# Patient Record
Sex: MALE | Race: BLACK OR AFRICAN AMERICAN | NOT HISPANIC OR LATINO | Employment: OTHER | URBAN - METROPOLITAN AREA
[De-identification: names, ages, dates, MRNs, and addresses within clinical notes are randomized per-mention and may not be internally consistent; named-entity substitution may affect disease eponyms.]

---

## 2023-09-26 ENCOUNTER — TELEPHONE (OUTPATIENT)
Dept: NEUROLOGY | Facility: CLINIC | Age: 73
End: 2023-09-26

## 2023-09-26 NOTE — TELEPHONE ENCOUNTER
Caller: ANIBAL    Relationship: DAUGHTER    Best call back number:   Telephone Information:   Mobile [phone]     What is the best time to reach you: ANYTIME    Who are you requesting to speak with (clinical staff, provider,  specific staff member): PROVIDER    What was the call regarding: SHE STATES SHE WOULD LIKE TO SPEAK WITH PROVIDER BEFORE THE APPOINTMENT TO UPDATE HER ON THINGS THAT HAS TRANSPIRED SINCE SHE LEFT [facility] AND JOINED [facility], HIS WIFE IS DYING OF CANCER AND HE IS STARTING TO HALLUCINATE.  SHE STATES SHE WOULD LIKE TO DISCUSS PRIVATELY.

## 2023-09-28 NOTE — TELEPHONE ENCOUNTER
This is the patient we spoke about. Can you get them the number for senior home transitions and then schedule them for next Wednesday.

## 2023-10-04 ENCOUNTER — OFFICE VISIT (OUTPATIENT)
Dept: NEUROLOGY | Facility: CLINIC | Age: 73
End: 2023-10-04

## 2023-10-04 VITALS
OXYGEN SATURATION: 98 % | HEIGHT: 66 IN | DIASTOLIC BLOOD PRESSURE: 60 MMHG | SYSTOLIC BLOOD PRESSURE: 110 MMHG | BODY MASS INDEX: 24.11 KG/M2 | WEIGHT: 150 LBS | HEART RATE: 81 BPM

## 2023-10-04 DIAGNOSIS — G81.94 LEFT HEMIPARESIS: ICD-10-CM

## 2023-10-04 DIAGNOSIS — I69.30 HISTORY OF STROKE WITH RESIDUAL DEFICIT: ICD-10-CM

## 2023-10-04 DIAGNOSIS — F32.A DEPRESSION, UNSPECIFIED DEPRESSION TYPE: ICD-10-CM

## 2023-10-04 DIAGNOSIS — F03.90 DEMENTIA WITHOUT BEHAVIORAL DISTURBANCE: Primary | ICD-10-CM

## 2023-10-04 RX ORDER — ATORVASTATIN CALCIUM 40 MG/1
40 TABLET, FILM COATED ORAL DAILY
COMMUNITY
Start: 2023-08-30

## 2023-10-04 RX ORDER — HYDROCHLOROTHIAZIDE 25 MG/1
TABLET ORAL
COMMUNITY
Start: 2023-05-15

## 2023-10-04 RX ORDER — CLOPIDOGREL BISULFATE 75 MG/1
1 TABLET ORAL DAILY
COMMUNITY
Start: 2023-06-09

## 2023-10-04 RX ORDER — SERTRALINE HYDROCHLORIDE 25 MG/1
25 TABLET, FILM COATED ORAL DAILY
Qty: 30 TABLET | Refills: 2 | Status: SHIPPED | OUTPATIENT
Start: 2023-10-04 | End: 2024-01-02

## 2023-10-04 RX ORDER — AMLODIPINE BESYLATE 5 MG/1
1 TABLET ORAL DAILY
COMMUNITY
Start: 2023-09-07

## 2023-10-04 RX ORDER — DONEPEZIL HYDROCHLORIDE 10 MG/1
10 TABLET, FILM COATED ORAL
COMMUNITY
Start: 2023-05-15 | End: 2023-11-11

## 2023-10-04 RX ORDER — CARBAMAZEPINE 300 MG/1
300 CAPSULE, EXTENDED RELEASE ORAL
COMMUNITY
Start: 2023-05-15 | End: 2023-11-11

## 2023-10-04 RX ORDER — MEMANTINE HYDROCHLORIDE 10 MG/1
10 TABLET ORAL
COMMUNITY
Start: 2023-05-15 | End: 2023-11-11

## 2023-10-04 RX ORDER — LOSARTAN POTASSIUM 100 MG/1
TABLET ORAL
COMMUNITY
Start: 2023-05-12

## 2023-10-04 NOTE — PROGRESS NOTES
[facility] NEUROLOGY         Date of Visit: 10/4/2023    Name: Anish Cross    :  1950    PCP: Danilo Walker MD    Visit Type: follow-up         Subjective     Patient ID: Anish is a 72 y.o. male.         History of Present Illness  I had the pleasure of seeing your patient today. As you may know he is a 72-year-old male here today for follow-up appointment with history of vascular dementia and previous history of thalamic stroke. Patient is accompanied by his daughter who is one of his primary caregivers.    History:    Patient has a history of previous right thalamic stroke. He does have left-sided hyperparesthesia as well as sensation loss. He is taking carbamazepine 300 mg twice daily for management of pain symptoms in regard to this. We increased dosage at visit on 2022 due to complaints of increasing painful paresthesias. He does also still take Plavix for stroke prevention.    He was also diagnosed with vascular dementia. He takes Aricept and memantine as prescribed.    Current:    Since last appointment with patient his daughter states wife was diagnosed with cancer and is now in hospice care with end-of-life care being performed.  Since this is happened the patient has had some worsening confusion including hallucinations and frequent crying.  She actually mentions some of the symptoms to his primary care doctor who did prescribe Seroquel have that however they had decided to not start the medication due to concerns from some of the side effects of the medicine.  He reports being very sad at times to the point that he will cry intermittently throughout the day.  His daughter does stay with him regularly now however they are also starting to look into some longer-term care solutions as his wife will not be returning to home.  He is still taking all of his other medications as prescribed.  He has not had any new fall issues although this is one of their concerns with  starting on the Seroquel.  No other new neurological complaints at today's visit.      The following portions of the patient's history were reviewed and updated as appropriate: allergies, current medications, past family history, past medical history, past social history, past surgical history, and problem list.                 Review of Systems   Constitutional:  Negative for activity change, appetite change, fatigue and unexpected weight change.   HENT:  Negative for hearing loss and trouble swallowing.    Eyes:  Negative for visual disturbance.   Respiratory:  Negative for chest tightness and shortness of breath.    Cardiovascular:  Negative for palpitations.   Gastrointestinal:  Negative for constipation, diarrhea, nausea and vomiting.   Genitourinary:  Negative for decreased urine volume, difficulty urinating and frequency.   Musculoskeletal:  Positive for gait problem.   Neurological:  Positive for weakness and numbness. Negative for tremors, syncope, facial asymmetry, speech difficulty and light-headedness.   Psychiatric/Behavioral:  Positive for confusion, dysphoric mood and hallucinations. Negative for agitation, behavioral problems and sleep disturbance. The patient is not nervous/anxious.           Current Medications:    Current Outpatient Medications   Medication Instructions    amLODIPine (NORVASC) 5 MG tablet 1 tablet, Oral, Daily    atorvastatin (LIPITOR) 40 mg, Oral, Daily    carBAMazepine (CARBATROL) 300 mg, Oral    clopidogrel (PLAVIX) 75 MG tablet 1 tablet, Oral, Daily    donepezil (ARICEPT) 10 mg, Oral    folic acid-pyridoxine-cyanocobalamin (FOLBIC) 2.5-25-2 MG tablet tablet 1 tablet, Oral, 2 Times Daily    hydroCHLOROthiazide (HYDRODIURIL) 25 MG tablet TAKE ONE TABLET BY MOUTH DAILY WITH LOSARTAN 100MG    losartan (COZAAR) 100 MG tablet TAKE ONE TABLET BY MOUTH DAILY WITH HYDROCHLOTHIAZIDE 25 MG    memantine (NAMENDA) 10 mg, Oral    sertraline (ZOLOFT) 25 mg, Oral, Daily          /60    "Pulse 81   Ht 167.6 cm (66\")   Wt 68 kg (150 lb)   SpO2 98%   BMI 24.21 kg/m²                Objective     Neurological Exam  Mental Status  Awake and alert. Oriented only to person and place. Speech is normal. Language is fluent with no aphasia.    Cranial Nerves  CN II: Visual fields full to confrontation.  CN III, IV, VI: Extraocular movements intact bilaterally. Normal lids and orbits bilaterally. Pupils equal round and reactive to light bilaterally.  CN V: Facial sensation is normal.  CN VII: Full and symmetric facial movement.  CN IX, X: Palate elevates symmetrically  CN XI: Shoulder shrug strength is normal.  CN XII: Tongue midline without atrophy or fasciculations.    Motor  Normal muscle bulk throughout. Normal muscle tone. No abnormal involuntary movements. Left hemiparesis. Mild weakness of the left side arm and leg.    Sensory  Left hemisensory loss.     Reflexes  Deep tendon reflexes are 2+ and symmetric in all four extremities.    Coordination    Finger-to-nose, rapid alternating movements and heel-to-shin normal bilaterally without dysmetria.    Gait  Casual gait: Normal stance. Reduced stride length. Ataxic gait. Swings left lower extremity.    Physical Exam  Constitutional:       General: He is awake.      Appearance: Normal appearance. He is normal weight.   HENT:      Head: Normocephalic.   Eyes:      General: Lids are normal.      Extraocular Movements: Extraocular movements intact.      Pupils: Pupils are equal, round, and reactive to light.   Pulmonary:      Effort: Pulmonary effort is normal.   Musculoskeletal:         General: Normal range of motion.   Skin:     General: Skin is warm.   Neurological:      Mental Status: He is alert.      Coordination: Coordination is intact.      Deep Tendon Reflexes: Reflexes are normal and symmetric.   Psychiatric:         Mood and Affect: Mood normal.         Speech: Speech normal.         Behavior: Behavior normal.         Thought Content: Thought " content normal.                   Assessment & Plan     Diagnoses and all orders for this visit:    1. Dementia without behavioral disturbance (Primary)    2. Depression, unspecified depression type  -     sertraline (ZOLOFT) 25 MG tablet; Take 1 tablet by mouth Daily for 90 days.  Dispense: 30 tablet; Refill: 2    3. History of stroke with residual deficit    4. Left hemiparesis       At this time we did discuss Seroquel as well as something like Zoloft for management of some of the mood changes and issues patient has been having since his wife has gotten sick.  We will go ahead and start him on a low-dose of Zoloft to help with the crying and depression symptoms.  We are going to hold off on additional medication to help with hallucinations as things have seemed to improve the last several days.    I did also give them the number for Senior home transitions as this is someone who may be able to help them discuss placement options or caregiving options.    Continue with medications for memory and paresthesias as prescribed.    Follow-up in 6 weeks or sooner if needed.            Itzel BROWN    Neurology    [facility] [place]    Phone: [phone]    10/4/2023 , 15:42 EDT

## 2023-10-13 ENCOUNTER — PATIENT ROUNDING (BHMG ONLY) (OUTPATIENT)
Dept: NEUROLOGY | Facility: CLINIC | Age: 73
End: 2023-10-13

## 2023-11-15 ENCOUNTER — OFFICE VISIT (OUTPATIENT)
Dept: NEUROLOGY | Facility: CLINIC | Age: 73
End: 2023-11-15
Payer: MEDICARE

## 2023-11-15 VITALS
WEIGHT: 150 LBS | DIASTOLIC BLOOD PRESSURE: 60 MMHG | BODY MASS INDEX: 24.11 KG/M2 | SYSTOLIC BLOOD PRESSURE: 116 MMHG | HEIGHT: 66 IN

## 2023-11-15 DIAGNOSIS — I69.30 HISTORY OF STROKE WITH RESIDUAL DEFICIT: ICD-10-CM

## 2023-11-15 DIAGNOSIS — G81.94 LEFT HEMIPARESIS: ICD-10-CM

## 2023-11-15 DIAGNOSIS — F32.A DEPRESSION, UNSPECIFIED DEPRESSION TYPE: ICD-10-CM

## 2023-11-15 DIAGNOSIS — F03.90 DEMENTIA WITHOUT BEHAVIORAL DISTURBANCE: Primary | ICD-10-CM

## 2023-11-15 PROCEDURE — 99214 OFFICE O/P EST MOD 30 MIN: CPT | Performed by: NURSE PRACTITIONER

## 2023-11-15 RX ORDER — SERTRALINE HYDROCHLORIDE 25 MG/1
25 TABLET, FILM COATED ORAL DAILY
Qty: 90 TABLET | Refills: 0 | Status: SHIPPED | OUTPATIENT
Start: 2023-11-15 | End: 2024-02-13

## 2023-11-15 NOTE — PROGRESS NOTES
[facility] NEUROLOGY         Date of Visit: 11/15/2023    Name: Anish Cross    :  1950    PCP: Danilo Walker MD    Visit Type: follow-up         Subjective     Patient ID: Anish is a 72 y.o. male.         History of Present Illness  I had the pleasure of seeing your patient today. As you may know he is a 72-year-old male here today for follow-up appointment with history of vascular dementia and previous history of thalamic stroke. Patient is accompanied by his daughter who is one of his primary caregivers.    History:    Patient has a history of previous right thalamic stroke. He does have left-sided hyperparesthesia as well as sensation loss. He is taking carbamazepine 300 mg twice daily for management of pain symptoms in regard to this. We increased dosage at visit on 2022 due to complaints of increasing painful paresthesias. He does also still take Plavix for stroke prevention.    He was also diagnosed with vascular dementia. He takes Aricept and memantine as prescribed.    10/04/2023: Patient presented to appointment with daughter for concerns of worsening vision, hallucinations, frequent crying.  He did find out that his wife was in hospice care and since she has been much of her care has been struggling significantly.  We did end up putting patient on Zoloft for mood changes to see if that helps as of the confusion.  We also consider possibly addition of Seroquel if he continued to have hallucinations.    Current:    Patient's daughter states that the left has been significantly helpful in improving patient's mood, decrease some of the confusion and hallucination issues.  He is back to where he was prior to his wife leaving.  He still is taking all other memory medications as prescribed.  He is still taking medication for the painful paresthesias related to his previous stroke.  He denies any other new or worsening symptoms or difficulty with ADLs.  He is still able  to help with medication administration and activities around the house.  No other new neurological complaints at today's visit.        The following portions of the patient's history were reviewed and updated as appropriate: allergies, current medications, past family history, past medical history, past social history, past surgical history, and problem list.                 Review of Systems   Constitutional:  Negative for activity change, appetite change, fatigue and unexpected weight change.   HENT:  Negative for hearing loss and trouble swallowing.    Eyes:  Negative for visual disturbance.   Respiratory:  Negative for chest tightness and shortness of breath.    Cardiovascular:  Negative for palpitations.   Gastrointestinal:  Negative for constipation, diarrhea, nausea and vomiting.   Genitourinary:  Negative for decreased urine volume, difficulty urinating and frequency.   Musculoskeletal:  Negative for gait problem.   Neurological:  Negative for tremors, syncope, facial asymmetry, speech difficulty, weakness and light-headedness.   Psychiatric/Behavioral:  Positive for confusion. Negative for agitation, behavioral problems, dysphoric mood, hallucinations and sleep disturbance. The patient is not nervous/anxious.             Current Medications:    Current Outpatient Medications   Medication Instructions    amLODIPine (NORVASC) 5 MG tablet 1 tablet, Oral, Daily    atorvastatin (LIPITOR) 40 mg, Oral, Daily    carBAMazepine (CARBATROL) 300 mg, Oral    clopidogrel (PLAVIX) 75 MG tablet 1 tablet, Oral, Daily    donepezil (ARICEPT) 10 mg, Oral    folic acid-pyridoxine-cyanocobalamin (FOLBIC) 2.5-25-2 MG tablet tablet 1 tablet, Oral, 2 Times Daily    hydroCHLOROthiazide (HYDRODIURIL) 25 MG tablet TAKE ONE TABLET BY MOUTH DAILY WITH LOSARTAN 100MG    losartan (COZAAR) 100 MG tablet TAKE ONE TABLET BY MOUTH DAILY WITH HYDROCHLOTHIAZIDE 25 MG    memantine (NAMENDA) 10 mg, Oral    sertraline (ZOLOFT) 25 mg, Oral, Daily  "         /60   Ht 167.6 cm (65.98\")   Wt 68 kg (150 lb)   BMI 24.22 kg/m²                Objective     Neurological Exam  Mental Status  Awake and alert. Oriented only to person and place. Speech is normal. Language is fluent with no aphasia.    Cranial Nerves  CN II: Visual fields full to confrontation.  CN III, IV, VI: Extraocular movements intact bilaterally. Normal lids and orbits bilaterally. Pupils equal round and reactive to light bilaterally.  CN V: Facial sensation is normal.  CN VII: Full and symmetric facial movement.  CN IX, X: Palate elevates symmetrically  CN XI: Shoulder shrug strength is normal.  CN XII: Tongue midline without atrophy or fasciculations.    Motor  Normal muscle bulk throughout. Normal muscle tone. No abnormal involuntary movements. Left hemiparesis. Mild weakness of the left side arm and leg.    Sensory  Left hemisensory loss.     Reflexes  Deep tendon reflexes are 2+ and symmetric in all four extremities.    Coordination    Finger-to-nose, rapid alternating movements and heel-to-shin normal bilaterally without dysmetria.    Gait  Casual gait: Normal stance. Reduced stride length. Ataxic gait. Swings left lower extremity.      Physical Exam  Constitutional:       General: He is awake.      Appearance: Normal appearance. He is normal weight.   HENT:      Head: Normocephalic.   Eyes:      General: Lids are normal.      Extraocular Movements: Extraocular movements intact.      Pupils: Pupils are equal, round, and reactive to light.   Pulmonary:      Effort: Pulmonary effort is normal.   Musculoskeletal:         General: Normal range of motion.   Skin:     General: Skin is warm.   Neurological:      Mental Status: He is alert.      Coordination: Coordination is intact.      Deep Tendon Reflexes: Reflexes are normal and symmetric.   Psychiatric:         Mood and Affect: Mood normal.         Speech: Speech normal.         Behavior: Behavior normal.         Thought Content: Thought " content normal.                     Assessment & Plan     Diagnoses and all orders for this visit:    1. Dementia without behavioral disturbance (Primary)    2. History of stroke with residual deficit    3. Left hemiparesis    4. Depression, unspecified depression type  -     sertraline (ZOLOFT) 25 MG tablet; Take 1 tablet by mouth Daily for 90 days.  Dispense: 90 tablet; Refill: 0         At this time we we will continue Seroquel for mood.  We will continue memory medications as prescribed for dementia.    Continue with medications for memory and paresthesias as prescribed.    Follow-up in 3 months or sooner if needed.            Itzel BROWN    Neurology    [facility] [place]    Phone: [phone]    11/15/2023 , 18:43 EST

## 2024-02-20 ENCOUNTER — OFFICE VISIT (OUTPATIENT)
Dept: NEUROLOGY | Facility: CLINIC | Age: 74
End: 2024-02-20
Payer: MEDICARE

## 2024-02-20 VITALS
HEART RATE: 68 BPM | BODY MASS INDEX: 24.11 KG/M2 | SYSTOLIC BLOOD PRESSURE: 118 MMHG | WEIGHT: 150 LBS | OXYGEN SATURATION: 98 % | HEIGHT: 66 IN | DIASTOLIC BLOOD PRESSURE: 70 MMHG

## 2024-02-20 DIAGNOSIS — I69.30 HISTORY OF STROKE WITH RESIDUAL DEFICIT: ICD-10-CM

## 2024-02-20 DIAGNOSIS — G81.94 LEFT HEMIPARESIS: ICD-10-CM

## 2024-02-20 DIAGNOSIS — F32.A DEPRESSION, UNSPECIFIED DEPRESSION TYPE: ICD-10-CM

## 2024-02-20 DIAGNOSIS — F03.90 DEMENTIA WITHOUT BEHAVIORAL DISTURBANCE: Primary | ICD-10-CM

## 2024-02-20 PROBLEM — F01.50 VASCULAR DEMENTIA WITHOUT BEHAVIORAL DISTURBANCE: Status: ACTIVE | Noted: 2021-07-19

## 2024-02-20 PROBLEM — E78.5 DYSLIPIDEMIA: Status: ACTIVE | Noted: 2024-02-13

## 2024-02-20 PROBLEM — M77.9 TENDINITIS: Status: ACTIVE | Noted: 2019-06-06

## 2024-02-20 PROCEDURE — 99214 OFFICE O/P EST MOD 30 MIN: CPT | Performed by: NURSE PRACTITIONER

## 2024-02-20 PROCEDURE — 1159F MED LIST DOCD IN RCRD: CPT | Performed by: NURSE PRACTITIONER

## 2024-02-20 PROCEDURE — 3074F SYST BP LT 130 MM HG: CPT | Performed by: NURSE PRACTITIONER

## 2024-02-20 PROCEDURE — 1160F RVW MEDS BY RX/DR IN RCRD: CPT | Performed by: NURSE PRACTITIONER

## 2024-02-20 PROCEDURE — 3078F DIAST BP <80 MM HG: CPT | Performed by: NURSE PRACTITIONER

## 2024-02-20 RX ORDER — SERTRALINE HYDROCHLORIDE 25 MG/1
25 TABLET, FILM COATED ORAL DAILY
Qty: 90 TABLET | Refills: 0 | Status: SHIPPED | OUTPATIENT
Start: 2024-02-20 | End: 2024-05-20

## 2024-02-20 RX ORDER — DONEPEZIL HYDROCHLORIDE 10 MG/1
10 TABLET, FILM COATED ORAL NIGHTLY
Qty: 90 TABLET | Refills: 1 | Status: SHIPPED | OUTPATIENT
Start: 2024-02-20 | End: 2024-08-18

## 2024-02-20 RX ORDER — MEMANTINE HYDROCHLORIDE 10 MG/1
10 TABLET ORAL 2 TIMES DAILY
Qty: 180 TABLET | Refills: 1 | Status: SHIPPED | OUTPATIENT
Start: 2024-02-20 | End: 2024-08-18

## 2024-02-20 RX ORDER — CARBAMAZEPINE 300 MG/1
300 CAPSULE, EXTENDED RELEASE ORAL 2 TIMES DAILY
Qty: 180 CAPSULE | Refills: 1 | Status: SHIPPED | OUTPATIENT
Start: 2024-02-20 | End: 2024-08-18

## 2024-02-20 NOTE — PROGRESS NOTES
[facility] NEUROLOGY         Date of Visit: 2024    Name: Anish Cross    :  1950    PCP: Danilo Walker MD    Visit Type: follow-up         Subjective     Patient ID: Anish is a 73 y.o. male.         History of Present Illness  I had the pleasure of seeing your patient today. As you may know he is a 72-year-old male here today for follow-up appointment with history of vascular dementia and previous history of thalamic stroke. Patient is accompanied by his daughter who is one of his primary caregivers.    History:    Patient has a history of previous right thalamic stroke. He does have left-sided hyperparesthesia as well as sensation loss. He is taking carbamazepine 300 mg twice daily for management of pain symptoms in regard to this. We increased dosage at visit on 2022 due to complaints of increasing painful paresthesias. He does also still take Plavix for stroke prevention.    He was also diagnosed with vascular dementia. He takes Aricept and memantine as prescribed.    10/04/2023: Patient presented to appointment with daughter for concerns of worsening vision, hallucinations, frequent crying.  He did find out that his wife was in hospice care and since she has been much of her care has been struggling significantly.  We did end up putting patient on Zoloft for mood changes to see if that helps as of the confusion.  We also consider possibly addition of Seroquel if he continued to have hallucinations.    Current:    Patient and daughter state that he has been doing very well since his last appointment.  They actually have noticed some improvement overall.  They state that the Zoloft for his depression has been significantly helpful.  They also have worked with him on his eating and water intake and he is actually done very well with good kidney function on his last laboratory studies as well as an increase in weight and approximately 3 pounds.  He is still taking  carbamazepine 300 mg for the hyperparesthesias related to his previous stomach stroke.  He is tolerating the medicine without side effect complaints.  He is also taking Aricept and memantine for treatment of vascular dementia and doing well on these.  He is still very forgetful however is still able to perform many of his activities with minimal assistance.  He is not having any new hallucinations.  No agitation issues.  He is sleeping well.  No other new neurological complaints at today's visit.        The following portions of the patient's history were reviewed and updated as appropriate: allergies, current medications, past family history, past medical history, past social history, past surgical history, and problem list.                 Review of Systems   Constitutional:  Negative for activity change, appetite change and unexpected weight change.   HENT:  Negative for hearing loss and trouble swallowing.    Eyes:  Negative for visual disturbance.   Respiratory:  Negative for chest tightness and shortness of breath.    Cardiovascular:  Negative for palpitations.   Gastrointestinal:  Negative for constipation and diarrhea.   Genitourinary:  Negative for decreased urine volume, difficulty urinating and frequency.   Musculoskeletal:  Negative for gait problem.   Neurological:  Negative for tremors, syncope, facial asymmetry, speech difficulty and light-headedness.   Psychiatric/Behavioral:  Positive for confusion. Negative for agitation, behavioral problems, dysphoric mood, hallucinations and sleep disturbance. The patient is not nervous/anxious.             Current Medications:    Current Outpatient Medications   Medication Instructions    amLODIPine (NORVASC) 5 MG tablet 1 tablet, Oral, Daily    atorvastatin (LIPITOR) 40 mg, Oral, Daily    carBAMazepine (CARBATROL) 300 mg, Oral, 2 Times Daily    clopidogrel (PLAVIX) 75 MG tablet 1 tablet, Oral, Daily    donepezil (ARICEPT) 10 mg, Oral, Nightly    folic  "acid-pyridoxine-cyanocobalamin (FOLBIC) 2.5-25-2 MG tablet tablet 1 tablet, Oral, 2 Times Daily    hydroCHLOROthiazide (HYDRODIURIL) 25 MG tablet TAKE ONE TABLET BY MOUTH DAILY WITH LOSARTAN 100MG    losartan (COZAAR) 100 MG tablet TAKE ONE TABLET BY MOUTH DAILY WITH HYDROCHLOTHIAZIDE 25 MG    memantine (NAMENDA) 10 mg, Oral, 2 Times Daily    sertraline (ZOLOFT) 25 mg, Oral, Daily          /70   Pulse 68   Ht 167.6 cm (65.98\")   Wt 68 kg (150 lb)   SpO2 98%   BMI 24.23 kg/m²                Objective     Neurological Exam  Mental Status  Awake and alert. Oriented only to person and place. Speech is normal. Language is fluent with no aphasia.    Cranial Nerves  CN II: Visual fields full to confrontation.  CN III, IV, VI: Extraocular movements intact bilaterally. Normal lids and orbits bilaterally. Pupils equal round and reactive to light bilaterally.  CN V: Facial sensation is normal.  CN VII: Full and symmetric facial movement.  CN IX, X: Palate elevates symmetrically  CN XI: Shoulder shrug strength is normal.  CN XII: Tongue midline without atrophy or fasciculations.    Motor  Normal muscle bulk throughout. Normal muscle tone. No abnormal involuntary movements. Left hemiparesis. Mild weakness of the left side arm and leg.    Sensory  Left hemisensory loss.     Reflexes  Deep tendon reflexes are 2+ and symmetric in all four extremities.    Coordination    Finger-to-nose, rapid alternating movements and heel-to-shin normal bilaterally without dysmetria.    Gait  Casual gait: Normal stance. Reduced stride length. Ataxic gait. Swings left lower extremity.      Physical Exam  Constitutional:       General: He is awake.      Appearance: Normal appearance. He is normal weight.   HENT:      Head: Normocephalic.   Eyes:      General: Lids are normal.      Extraocular Movements: Extraocular movements intact.      Pupils: Pupils are equal, round, and reactive to light.   Pulmonary:      Effort: Pulmonary effort is " normal.   Musculoskeletal:         General: Normal range of motion.   Skin:     General: Skin is warm.   Neurological:      Mental Status: He is alert.      Coordination: Coordination is intact.      Deep Tendon Reflexes: Reflexes are normal and symmetric.   Psychiatric:         Mood and Affect: Mood normal.         Speech: Speech normal.         Behavior: Behavior normal.         Thought Content: Thought content normal.                     Assessment & Plan     Diagnoses and all orders for this visit:    1. Dementia without behavioral disturbance (Primary)  -     memantine (NAMENDA) 10 MG tablet; Take 1 tablet by mouth 2 (Two) Times a Day for 180 days.  Dispense: 180 tablet; Refill: 1  -     donepezil (ARICEPT) 10 MG tablet; Take 1 tablet by mouth Every Night for 180 days.  Dispense: 90 tablet; Refill: 1    2. Depression, unspecified depression type  -     sertraline (ZOLOFT) 25 MG tablet; Take 1 tablet by mouth Daily for 90 days.  Dispense: 90 tablet; Refill: 0    3. Left hemiparesis    4. History of stroke with residual deficit  -     carBAMazepine (CARBATROL) 300 MG 12 hr capsule; Take 1 capsule by mouth 2 (Two) Times a Day for 180 days.  Dispense: 180 capsule; Refill: 1         At this time we we will continue Zoloft for mood disorder.    We will continue carbamazepine for treatment of paresthesias.    Continue memantine and donepezil for memory.    Follow-up in 6 months or sooner if needed.            Itzel BROWN    Neurology    [facility]rology [place]    Phone: [phone]    2/20/2024 , 12:26 EST

## 2024-05-16 DIAGNOSIS — F32.A DEPRESSION, UNSPECIFIED DEPRESSION TYPE: ICD-10-CM

## 2024-05-16 RX ORDER — SERTRALINE HYDROCHLORIDE 25 MG/1
25 TABLET, FILM COATED ORAL DAILY
Qty: 90 TABLET | Refills: 0 | Status: SHIPPED | OUTPATIENT
Start: 2024-05-16

## 2024-08-17 DIAGNOSIS — F32.A DEPRESSION, UNSPECIFIED DEPRESSION TYPE: ICD-10-CM

## 2024-08-20 ENCOUNTER — OFFICE VISIT (OUTPATIENT)
Dept: NEUROLOGY | Facility: CLINIC | Age: 74
End: 2024-08-20
Payer: MEDICARE

## 2024-08-20 VITALS
DIASTOLIC BLOOD PRESSURE: 64 MMHG | BODY MASS INDEX: 22.98 KG/M2 | WEIGHT: 143 LBS | HEIGHT: 66 IN | HEART RATE: 74 BPM | SYSTOLIC BLOOD PRESSURE: 136 MMHG | OXYGEN SATURATION: 99 %

## 2024-08-20 DIAGNOSIS — I69.30 HISTORY OF STROKE WITH RESIDUAL DEFICIT: ICD-10-CM

## 2024-08-20 DIAGNOSIS — F03.90 DEMENTIA WITHOUT BEHAVIORAL DISTURBANCE: Primary | ICD-10-CM

## 2024-08-20 DIAGNOSIS — F32.A DEPRESSION, UNSPECIFIED DEPRESSION TYPE: ICD-10-CM

## 2024-08-20 DIAGNOSIS — G81.94 LEFT HEMIPARESIS: ICD-10-CM

## 2024-08-20 RX ORDER — SERTRALINE HYDROCHLORIDE 25 MG/1
25 TABLET, FILM COATED ORAL DAILY
Qty: 90 TABLET | Refills: 0 | OUTPATIENT
Start: 2024-08-20

## 2024-08-20 RX ORDER — SERTRALINE HYDROCHLORIDE 25 MG/1
25 TABLET, FILM COATED ORAL DAILY
Qty: 90 TABLET | Refills: 1 | Status: SHIPPED | OUTPATIENT
Start: 2024-08-20 | End: 2025-02-16

## 2024-08-20 RX ORDER — DONEPEZIL HYDROCHLORIDE 10 MG/1
10 TABLET, FILM COATED ORAL NIGHTLY
Qty: 90 TABLET | Refills: 1 | Status: SHIPPED | OUTPATIENT
Start: 2024-08-20 | End: 2025-02-16

## 2024-08-20 RX ORDER — CARBAMAZEPINE 300 MG/1
300 CAPSULE, EXTENDED RELEASE ORAL 2 TIMES DAILY
Qty: 180 CAPSULE | Refills: 1 | Status: SHIPPED | OUTPATIENT
Start: 2024-08-20 | End: 2025-02-16

## 2024-08-20 RX ORDER — MEMANTINE HYDROCHLORIDE 10 MG/1
10 TABLET ORAL 2 TIMES DAILY
Qty: 180 TABLET | Refills: 1 | Status: SHIPPED | OUTPATIENT
Start: 2024-08-20 | End: 2025-02-16

## 2024-08-20 NOTE — PROGRESS NOTES
[facility] NEUROLOGY         Date of Visit: 2024    Name: Anish Cross    :  1950    PCP: Danilo Walker MD    Visit Type: follow-up         Subjective     Patient ID: Anish is a 73 y.o. male.         History of Present Illness  I had the pleasure of seeing your patient today. As you may know he is a 72-year-old male here today for follow-up appointment with history of vascular dementia and previous history of thalamic stroke. Patient is accompanied by his daughter who is one of his primary caregivers.    History:    Patient has a history of previous right thalamic stroke. He does have left-sided hyperparesthesia as well as sensation loss. He is taking carbamazepine 300 mg twice daily for management of pain symptoms in regard to this. We increased dosage at visit on 2022 due to complaints of increasing painful paresthesias. He does also still take Plavix for stroke prevention.    He was also diagnosed with vascular dementia. He takes Aricept and memantine as prescribed.    10/04/2023: Patient presented to appointment with daughter for concerns of worsening vision, hallucinations, frequent crying.  He did find out that his wife was in hospice care and since she has been much of her care has been struggling significantly.  We did end up putting patient on Zoloft for mood changes to see if that helps as of the confusion.  We also consider possibly addition of Seroquel if he continued to have hallucinations.    Current:    Patient and daughter state that he has been doing very well since his last appointment.  They actually have noticed some improvement overall.  They state that the Zoloft for his depression has been significantly helpful.  They state that they have noticed that he is continue to lose some weight.  He has had a 7 pound weight loss since his last appointment with us.  He is eating well and drinking well despite the weight loss.  He is still helping with  certain activities including taking out the trash and doing his own laundry.  He continues to have some worsening muscle weakness including more leaning to the right which is his dominant side not affected by the stroke.  He has not had any falls.  No worsening speech issues.  Confusion does tend to get difficult to wear about every 2 weeks or so he has a bad week.  She states that he then kind of recoup is a little bit and does well for a while.  He is very repetitive.  In general though he is happy with no increasing signs of depression, anxiety, or anger.  He is still able to perform his ADLs with minimal assistance.  No hallucinations or vivid dreams.  Sleep seems well.  No other new neurological complaints at today's visit.        The following portions of the patient's history were reviewed and updated as appropriate: allergies, current medications, past family history, past medical history, past social history, past surgical history, and problem list.                 Review of Systems   Constitutional:  Negative for activity change, appetite change and unexpected weight change.   HENT:  Negative for hearing loss and trouble swallowing.    Eyes:  Negative for visual disturbance.   Respiratory:  Negative for chest tightness and shortness of breath.    Cardiovascular:  Negative for palpitations.   Gastrointestinal:  Negative for constipation and diarrhea.   Genitourinary:  Negative for decreased urine volume, difficulty urinating and frequency.   Musculoskeletal:  Negative for gait problem.   Neurological:  Negative for tremors, syncope, facial asymmetry, speech difficulty and light-headedness.   Psychiatric/Behavioral:  Positive for confusion. Negative for agitation, behavioral problems, dysphoric mood, hallucinations and sleep disturbance. The patient is not nervous/anxious.             Current Medications:    Current Outpatient Medications   Medication Instructions    amLODIPine (NORVASC) 5 MG tablet 1  "tablet, Oral, Daily    atorvastatin (LIPITOR) 40 mg, Oral, Daily    carBAMazepine (CARBATROL) 300 mg, Oral, 2 Times Daily    clopidogrel (PLAVIX) 75 MG tablet 1 tablet, Oral, Daily    donepezil (ARICEPT) 10 mg, Oral, Nightly    folic acid-pyridoxine-cyanocobalamin (FOLBIC) 2.5-25-2 MG tablet tablet 1 tablet, Oral, 2 Times Daily    hydroCHLOROthiazide (HYDRODIURIL) 25 MG tablet TAKE ONE TABLET BY MOUTH DAILY WITH LOSARTAN 100MG    losartan (COZAAR) 100 MG tablet TAKE ONE TABLET BY MOUTH DAILY WITH HYDROCHLOTHIAZIDE 25 MG    memantine (NAMENDA) 10 mg, Oral, 2 Times Daily    sertraline (ZOLOFT) 25 mg, Oral, Daily          /64   Pulse 74   Ht 167.6 cm (65.98\")   Wt 64.9 kg (143 lb)   SpO2 99%   BMI 23.09 kg/m²                Objective     Neurological Exam  Mental Status  Awake and alert. Oriented only to person and place. Speech is normal. Language is fluent with no aphasia.    Cranial Nerves  CN II: Visual fields full to confrontation.  CN III, IV, VI: Extraocular movements intact bilaterally. Normal lids and orbits bilaterally. Pupils equal round and reactive to light bilaterally.  CN V: Facial sensation is normal.  CN VII: Full and symmetric facial movement.  CN IX, X: Palate elevates symmetrically  CN XI: Shoulder shrug strength is normal.  CN XII: Tongue midline without atrophy or fasciculations.    Motor  Normal muscle bulk throughout. Normal muscle tone. No abnormal involuntary movements. Left hemiparesis. Mild weakness of the left side arm and leg.    Sensory  Left hemisensory loss.     Reflexes  Deep tendon reflexes are 2+ and symmetric in all four extremities.    Coordination    Finger-to-nose, rapid alternating movements and heel-to-shin normal bilaterally without dysmetria.    Gait  Casual gait: Normal stance. Reduced stride length. Ataxic gait. Swings left lower extremity.      Physical Exam  Constitutional:       General: He is awake.      Appearance: Normal appearance. He is normal weight. "   HENT:      Head: Normocephalic.   Eyes:      General: Lids are normal.      Extraocular Movements: Extraocular movements intact.      Pupils: Pupils are equal, round, and reactive to light.   Pulmonary:      Effort: Pulmonary effort is normal.   Musculoskeletal:         General: Normal range of motion.   Skin:     General: Skin is warm.   Neurological:      Mental Status: He is alert.      Coordination: Coordination is intact.      Deep Tendon Reflexes: Reflexes are normal and symmetric.   Psychiatric:         Mood and Affect: Mood normal.         Speech: Speech normal.         Behavior: Behavior normal.         Thought Content: Thought content normal.                     Assessment & Plan     Diagnoses and all orders for this visit:    1. Dementia without behavioral disturbance (Primary)    2. Left hemiparesis  -     Ambulatory Referral to Physical Therapy for Evaluation & Treatment    3. History of stroke with residual deficit           At this time we we will continue Zoloft for mood disorder.    We will continue carbamazepine for treatment of paresthesias.    Continue memantine and donepezil for memory.    I am going to order PT due to some decreased core strength and generalized weakness.    In addition I would also like to have them add a protein shake in the morning.  We did talk about Glucerna as an option for low carbohydrate due to his diabetic history.    Follow-up in 6 months or sooner if needed.            Itzel BROWN    Neurology    [facility] [place]    Phone: [phone]    8/20/2024 , 12:18 EDT

## 2024-08-30 DIAGNOSIS — I69.30 HISTORY OF STROKE WITH RESIDUAL DEFICIT: ICD-10-CM

## 2024-08-30 RX ORDER — CARBAMAZEPINE 300 MG/1
300 CAPSULE, EXTENDED RELEASE ORAL 2 TIMES DAILY
Qty: 180 CAPSULE | Refills: 1 | OUTPATIENT
Start: 2024-08-30

## 2025-02-22 DIAGNOSIS — F32.A DEPRESSION, UNSPECIFIED DEPRESSION TYPE: ICD-10-CM

## 2025-02-22 DIAGNOSIS — I69.30 HISTORY OF STROKE WITH RESIDUAL DEFICIT: ICD-10-CM

## 2025-02-22 DIAGNOSIS — F03.90 DEMENTIA WITHOUT BEHAVIORAL DISTURBANCE: ICD-10-CM

## 2025-02-24 RX ORDER — SERTRALINE HYDROCHLORIDE 25 MG/1
25 TABLET, FILM COATED ORAL DAILY
Qty: 90 TABLET | Refills: 1 | Status: SHIPPED | OUTPATIENT
Start: 2025-02-24

## 2025-02-24 RX ORDER — CARBAMAZEPINE 300 MG/1
300 CAPSULE, EXTENDED RELEASE ORAL 2 TIMES DAILY
Qty: 180 CAPSULE | Refills: 1 | Status: SHIPPED | OUTPATIENT
Start: 2025-02-24

## 2025-02-24 RX ORDER — DONEPEZIL HYDROCHLORIDE 10 MG/1
10 TABLET, FILM COATED ORAL NIGHTLY
Qty: 90 TABLET | Refills: 1 | Status: SHIPPED | OUTPATIENT
Start: 2025-02-24

## 2025-02-24 RX ORDER — MEMANTINE HYDROCHLORIDE 10 MG/1
10 TABLET ORAL 2 TIMES DAILY
Qty: 180 TABLET | Refills: 1 | Status: SHIPPED | OUTPATIENT
Start: 2025-02-24

## 2025-03-04 ENCOUNTER — OFFICE VISIT (OUTPATIENT)
Dept: NEUROLOGY | Facility: CLINIC | Age: 75
End: 2025-03-04
Payer: MEDICARE

## 2025-03-04 VITALS
HEART RATE: 77 BPM | HEIGHT: 66 IN | BODY MASS INDEX: 23.09 KG/M2 | SYSTOLIC BLOOD PRESSURE: 150 MMHG | DIASTOLIC BLOOD PRESSURE: 62 MMHG | OXYGEN SATURATION: 98 %

## 2025-03-04 DIAGNOSIS — F03.90 DEMENTIA WITHOUT BEHAVIORAL DISTURBANCE: ICD-10-CM

## 2025-03-04 DIAGNOSIS — F32.A DEPRESSION, UNSPECIFIED DEPRESSION TYPE: ICD-10-CM

## 2025-03-04 DIAGNOSIS — I69.30 HISTORY OF STROKE WITH RESIDUAL DEFICIT: ICD-10-CM

## 2025-03-04 RX ORDER — SERTRALINE HYDROCHLORIDE 25 MG/1
25 TABLET, FILM COATED ORAL DAILY
Qty: 90 TABLET | Refills: 1 | Status: SHIPPED | OUTPATIENT
Start: 2025-03-04

## 2025-03-04 RX ORDER — CARBAMAZEPINE 300 MG/1
300 CAPSULE, EXTENDED RELEASE ORAL 2 TIMES DAILY
Qty: 180 CAPSULE | Refills: 1 | Status: SHIPPED | OUTPATIENT
Start: 2025-03-04 | End: 2025-08-31

## 2025-03-04 RX ORDER — DONEPEZIL HYDROCHLORIDE 10 MG/1
10 TABLET, FILM COATED ORAL NIGHTLY
Qty: 90 TABLET | Refills: 1 | Status: SHIPPED | OUTPATIENT
Start: 2025-03-04

## 2025-03-04 RX ORDER — MEMANTINE HYDROCHLORIDE 10 MG/1
10 TABLET ORAL 2 TIMES DAILY
Qty: 180 TABLET | Refills: 1 | Status: SHIPPED | OUTPATIENT
Start: 2025-03-04

## 2025-03-04 NOTE — PROGRESS NOTES
[facility] NEUROLOGY         Date of Visit: 3/4/2025    Name: Anish Cross    :  1950    PCP: Danilo Walker MD    Visit Type: follow-up         Subjective     Patient ID: Anish is a 74 y.o. male.         History of Present Illness  I had the pleasure of seeing your patient today. As you may know he is a 74-year-old male here today for follow-up appointment with history of vascular dementia and previous history of thalamic stroke. Patient is accompanied by his daughter who is one of his primary caregivers.    History:    Patient has a history of previous right thalamic stroke. He does have left-sided hyperparesthesia as well as sensation loss. He is taking carbamazepine 300 mg twice daily for management of pain symptoms in regard to this. We increased dosage at visit on 2022 due to complaints of increasing painful paresthesias. He does also still take Plavix for stroke prevention.    He was also diagnosed with vascular dementia. He takes Aricept and memantine as prescribed.    10/04/2023: Patient presented to appointment with daughter for concerns of worsening vision, hallucinations, frequent crying.  He did find out that his wife was in hospice care and since she has been much of her care has been struggling significantly.  We did end up putting patient on Zoloft for mood changes to see if that helps as of the confusion.  We also consider possibly addition of Seroquel if he continued to have hallucinations.    Current:    Patient and daughter state that he has been doing very well since his last appointment.  He continues to do well on his Zoloft for treatment of depression.  They state overall mood is very good.  He had had some weight loss at his last visit however has been able to put weight back on as he has been doing protein shakes in the morning.      He is still helping with certain activities including taking out the trash and doing his own laundry.  He continues to  have some worsening muscle weakness including more leaning to the right which is his dominant side not affected by the stroke.  He has not had any falls.  No worsening speech issues.  Confusion does tend to get worse about every 2 weeks or so where he has a bad week.  He is very repetitive.  In general though he is happy with no increasing signs of depression, anxiety, or anger.  He is still able to perform his ADLs with minimal assistance.  No hallucinations or vivid dreams.  Sleep seems well.  No other new neurological complaints at today's visit.        The following portions of the patient's history were reviewed and updated as appropriate: allergies, current medications, past family history, past medical history, past social history, past surgical history, and problem list.                 Review of Systems   Constitutional:  Negative for activity change, appetite change and unexpected weight change.   HENT:  Negative for hearing loss and trouble swallowing.    Eyes:  Negative for visual disturbance.   Respiratory:  Negative for chest tightness and shortness of breath.    Cardiovascular:  Negative for palpitations.   Gastrointestinal:  Negative for constipation and diarrhea.   Genitourinary:  Negative for decreased urine volume, difficulty urinating and frequency.   Musculoskeletal:  Negative for gait problem.   Neurological:  Negative for tremors, syncope, facial asymmetry, speech difficulty and light-headedness.   Psychiatric/Behavioral:  Positive for confusion. Negative for agitation, behavioral problems, dysphoric mood, hallucinations and sleep disturbance. The patient is not nervous/anxious.             Current Medications:    Current Outpatient Medications   Medication Instructions    amLODIPine (NORVASC) 5 MG tablet 1 tablet, Daily    atorvastatin (LIPITOR) 40 mg, Daily    carBAMazepine (CARBATROL) 300 mg, Oral, 2 Times Daily    clopidogrel (PLAVIX) 75 MG tablet 1 tablet, Daily    donepezil (ARICEPT) 10  "mg, Oral, Nightly    folic acid-pyridoxine-cyanocobalamin (FOLBIC) 2.5-25-2 MG tablet tablet 1 tablet, 2 Times Daily    hydroCHLOROthiazide (HYDRODIURIL) 25 MG tablet TAKE ONE TABLET BY MOUTH DAILY WITH LOSARTAN 100MG    losartan (COZAAR) 100 MG tablet TAKE ONE TABLET BY MOUTH DAILY WITH HYDROCHLOTHIAZIDE 25 MG    memantine (NAMENDA) 10 mg, Oral, 2 Times Daily    sertraline (ZOLOFT) 25 mg, Oral, Daily          /62   Pulse 77   Ht 167.6 cm (65.98\")   SpO2 98%   BMI 23.09 kg/m²                Objective     Neurological Exam  Mental Status  Awake and alert. Oriented only to person and place. Speech is normal. Language is fluent with no aphasia.    Cranial Nerves  CN II: Visual fields full to confrontation.  CN III, IV, VI: Extraocular movements intact bilaterally. Normal lids and orbits bilaterally. Pupils equal round and reactive to light bilaterally.  CN V: Facial sensation is normal.  CN VII: Full and symmetric facial movement.  CN IX, X: Palate elevates symmetrically  CN XI: Shoulder shrug strength is normal.  CN XII: Tongue midline without atrophy or fasciculations.    Motor  Normal muscle bulk throughout. Normal muscle tone. No abnormal involuntary movements. Left hemiparesis. Mild weakness of the left side arm and leg.    Sensory  Left hemisensory loss.     Reflexes  Deep tendon reflexes are 2+ and symmetric in all four extremities.    Coordination    Finger-to-nose, rapid alternating movements and heel-to-shin normal bilaterally without dysmetria.    Gait  Casual gait: Normal stance. Reduced stride length. Ataxic gait. Swings left lower extremity.      Physical Exam  Constitutional:       General: He is awake.      Appearance: Normal appearance. He is normal weight.   HENT:      Head: Normocephalic.   Eyes:      General: Lids are normal.      Extraocular Movements: Extraocular movements intact.      Pupils: Pupils are equal, round, and reactive to light.   Pulmonary:      Effort: Pulmonary effort is " normal.   Musculoskeletal:         General: Normal range of motion.   Skin:     General: Skin is warm.   Neurological:      Mental Status: He is alert.      Coordination: Coordination is intact.      Deep Tendon Reflexes: Reflexes are normal and symmetric.   Psychiatric:         Mood and Affect: Mood normal.         Speech: Speech normal.         Behavior: Behavior normal.         Thought Content: Thought content normal.                     Assessment & Plan     Diagnoses and all orders for this visit:    1. Dementia without behavioral disturbance  -     donepezil (ARICEPT) 10 MG tablet; Take 1 tablet by mouth Every Night.  Dispense: 90 tablet; Refill: 1  -     memantine (NAMENDA) 10 MG tablet; Take 1 tablet by mouth 2 (Two) Times a Day.  Dispense: 180 tablet; Refill: 1    2. History of stroke with residual deficit  -     carBAMazepine (CARBATROL) 300 MG 12 hr capsule; Take 1 capsule by mouth 2 (Two) Times a Day for 180 days.  Dispense: 180 capsule; Refill: 1    3. Depression, unspecified depression type  -     sertraline (ZOLOFT) 25 MG tablet; Take 1 tablet by mouth Daily.  Dispense: 90 tablet; Refill: 1           At this time we we will continue Zoloft for mood disorder.    We will continue carbamazepine for treatment of paresthesias.    Continue memantine and donepezil for memory.    Follow-up in 6 months or sooner if needed.            Itzel BROWN    Neurology    [facility] [place]    Phone: [phone]    3/4/2025 , 16:12 EST